# Patient Record
Sex: MALE | Race: OTHER
[De-identification: names, ages, dates, MRNs, and addresses within clinical notes are randomized per-mention and may not be internally consistent; named-entity substitution may affect disease eponyms.]

---

## 2019-05-30 ENCOUNTER — HOSPITAL ENCOUNTER (INPATIENT)
Dept: HOSPITAL 72 - EMR | Age: 69
LOS: 4 days | Discharge: NURSING, REHAB OR LONG-TERM CARE | DRG: 309 | End: 2019-06-03
Payer: MEDICARE

## 2019-05-30 VITALS — DIASTOLIC BLOOD PRESSURE: 66 MMHG | SYSTOLIC BLOOD PRESSURE: 118 MMHG

## 2019-05-30 VITALS — WEIGHT: 161 LBS | HEIGHT: 67 IN | BODY MASS INDEX: 25.27 KG/M2

## 2019-05-30 VITALS — DIASTOLIC BLOOD PRESSURE: 100 MMHG | SYSTOLIC BLOOD PRESSURE: 146 MMHG

## 2019-05-30 DIAGNOSIS — E78.5: ICD-10-CM

## 2019-05-30 DIAGNOSIS — I11.9: ICD-10-CM

## 2019-05-30 DIAGNOSIS — G93.40: ICD-10-CM

## 2019-05-30 DIAGNOSIS — I25.118: ICD-10-CM

## 2019-05-30 DIAGNOSIS — I48.0: Primary | ICD-10-CM

## 2019-05-30 DIAGNOSIS — I69.354: ICD-10-CM

## 2019-05-30 DIAGNOSIS — E11.65: ICD-10-CM

## 2019-05-30 DIAGNOSIS — F03.91: ICD-10-CM

## 2019-05-30 DIAGNOSIS — K21.9: ICD-10-CM

## 2019-05-30 LAB
ADD MANUAL DIFF: NO
ALBUMIN SERPL-MCNC: 3.5 G/DL (ref 3.4–5)
ALBUMIN/GLOB SERPL: 0.8 {RATIO} (ref 1–2.7)
ALP SERPL-CCNC: 87 U/L (ref 46–116)
ALT SERPL-CCNC: 23 U/L (ref 12–78)
ANION GAP SERPL CALC-SCNC: 11 MMOL/L (ref 5–15)
AST SERPL-CCNC: 23 U/L (ref 15–37)
BASOPHILS NFR BLD AUTO: 1.2 % (ref 0–2)
BILIRUB SERPL-MCNC: 0.3 MG/DL (ref 0.2–1)
BUN SERPL-MCNC: 19 MG/DL (ref 7–18)
CALCIUM SERPL-MCNC: 9.2 MG/DL (ref 8.5–10.1)
CHLORIDE SERPL-SCNC: 104 MMOL/L (ref 98–107)
CO2 SERPL-SCNC: 24 MMOL/L (ref 21–32)
CREAT SERPL-MCNC: 1 MG/DL (ref 0.55–1.3)
EOSINOPHIL NFR BLD AUTO: 1.4 % (ref 0–3)
ERYTHROCYTE [DISTWIDTH] IN BLOOD BY AUTOMATED COUNT: 12.9 % (ref 11.6–14.8)
GLOBULIN SER-MCNC: 4.4 G/DL
HCT VFR BLD CALC: 42.9 % (ref 42–52)
HGB BLD-MCNC: 14.5 G/DL (ref 14.2–18)
LYMPHOCYTES NFR BLD AUTO: 25.5 % (ref 20–45)
MCV RBC AUTO: 78 FL (ref 80–99)
MONOCYTES NFR BLD AUTO: 6.3 % (ref 1–10)
NEUTROPHILS NFR BLD AUTO: 65.6 % (ref 45–75)
PLATELET # BLD: 279 K/UL (ref 150–450)
POTASSIUM SERPL-SCNC: 3.8 MMOL/L (ref 3.5–5.1)
RBC # BLD AUTO: 5.52 M/UL (ref 4.7–6.1)
SODIUM SERPL-SCNC: 139 MMOL/L (ref 136–145)
WBC # BLD AUTO: 9.2 K/UL (ref 4.8–10.8)

## 2019-05-30 PROCEDURE — 80329 ANALGESICS NON-OPIOID 1 OR 2: CPT

## 2019-05-30 PROCEDURE — 93005 ELECTROCARDIOGRAM TRACING: CPT

## 2019-05-30 PROCEDURE — 93306 TTE W/DOPPLER COMPLETE: CPT

## 2019-05-30 PROCEDURE — 80307 DRUG TEST PRSMV CHEM ANLYZR: CPT

## 2019-05-30 PROCEDURE — 80053 COMPREHEN METABOLIC PANEL: CPT

## 2019-05-30 PROCEDURE — 80048 BASIC METABOLIC PNL TOTAL CA: CPT

## 2019-05-30 PROCEDURE — 84443 ASSAY THYROID STIM HORMONE: CPT

## 2019-05-30 PROCEDURE — 36415 COLL VENOUS BLD VENIPUNCTURE: CPT

## 2019-05-30 PROCEDURE — 99285 EMERGENCY DEPT VISIT HI MDM: CPT

## 2019-05-30 PROCEDURE — 87081 CULTURE SCREEN ONLY: CPT

## 2019-05-30 PROCEDURE — 83735 ASSAY OF MAGNESIUM: CPT

## 2019-05-30 PROCEDURE — 85025 COMPLETE CBC W/AUTO DIFF WBC: CPT

## 2019-05-30 PROCEDURE — 83880 ASSAY OF NATRIURETIC PEPTIDE: CPT

## 2019-05-30 PROCEDURE — 84484 ASSAY OF TROPONIN QUANT: CPT

## 2019-05-30 PROCEDURE — 96374 THER/PROPH/DIAG INJ IV PUSH: CPT

## 2019-05-30 PROCEDURE — 96375 TX/PRO/DX INJ NEW DRUG ADDON: CPT
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
## 2019-05-31 VITALS — SYSTOLIC BLOOD PRESSURE: 115 MMHG | DIASTOLIC BLOOD PRESSURE: 75 MMHG

## 2019-05-31 VITALS — DIASTOLIC BLOOD PRESSURE: 53 MMHG | SYSTOLIC BLOOD PRESSURE: 104 MMHG

## 2019-05-31 VITALS — SYSTOLIC BLOOD PRESSURE: 106 MMHG | DIASTOLIC BLOOD PRESSURE: 88 MMHG

## 2019-05-31 VITALS — DIASTOLIC BLOOD PRESSURE: 55 MMHG | SYSTOLIC BLOOD PRESSURE: 106 MMHG

## 2019-05-31 VITALS — SYSTOLIC BLOOD PRESSURE: 86 MMHG | DIASTOLIC BLOOD PRESSURE: 60 MMHG

## 2019-05-31 VITALS — DIASTOLIC BLOOD PRESSURE: 64 MMHG | SYSTOLIC BLOOD PRESSURE: 90 MMHG

## 2019-05-31 VITALS — DIASTOLIC BLOOD PRESSURE: 60 MMHG | SYSTOLIC BLOOD PRESSURE: 107 MMHG

## 2019-05-31 LAB
ADD MANUAL DIFF: NO
ANION GAP SERPL CALC-SCNC: 10 MMOL/L (ref 5–15)
BASOPHILS NFR BLD AUTO: 1 % (ref 0–2)
BUN SERPL-MCNC: 14 MG/DL (ref 7–18)
CALCIUM SERPL-MCNC: 8.7 MG/DL (ref 8.5–10.1)
CHLORIDE SERPL-SCNC: 105 MMOL/L (ref 98–107)
CO2 SERPL-SCNC: 23 MMOL/L (ref 21–32)
CREAT SERPL-MCNC: 0.8 MG/DL (ref 0.55–1.3)
EOSINOPHIL NFR BLD AUTO: 0.8 % (ref 0–3)
ERYTHROCYTE [DISTWIDTH] IN BLOOD BY AUTOMATED COUNT: 13.7 % (ref 11.6–14.8)
HCT VFR BLD CALC: 43.9 % (ref 42–52)
HGB BLD-MCNC: 14.5 G/DL (ref 14.2–18)
LYMPHOCYTES NFR BLD AUTO: 34.9 % (ref 20–45)
MCV RBC AUTO: 80 FL (ref 80–99)
MONOCYTES NFR BLD AUTO: 7.3 % (ref 1–10)
NEUTROPHILS NFR BLD AUTO: 56 % (ref 45–75)
PLATELET # BLD: 281 K/UL (ref 150–450)
POTASSIUM SERPL-SCNC: 4.1 MMOL/L (ref 3.5–5.1)
RBC # BLD AUTO: 5.52 M/UL (ref 4.7–6.1)
SODIUM SERPL-SCNC: 138 MMOL/L (ref 136–145)
WBC # BLD AUTO: 8 K/UL (ref 4.8–10.8)

## 2019-05-31 RX ADMIN — HEPARIN SODIUM SCH UNITS: 5000 INJECTION INTRAVENOUS; SUBCUTANEOUS at 09:56

## 2019-05-31 RX ADMIN — METOPROLOL TARTRATE SCH MG: 25 TABLET, FILM COATED ORAL at 09:00

## 2019-05-31 RX ADMIN — HEPARIN SODIUM SCH UNITS: 5000 INJECTION INTRAVENOUS; SUBCUTANEOUS at 21:12

## 2019-05-31 RX ADMIN — DOCUSATE SODIUM SCH MG: 100 CAPSULE, LIQUID FILLED ORAL at 09:51

## 2019-05-31 RX ADMIN — LATANOPROST SCH DROP: 50 SOLUTION OPHTHALMIC at 21:04

## 2019-05-31 RX ADMIN — METOPROLOL TARTRATE SCH MG: 25 TABLET, FILM COATED ORAL at 21:03

## 2019-05-31 NOTE — NUR
NURSE NOTES:

Left a message to  regarding admission orders and new order received. Pt appears 
calm at this time, eating snacks. Afib with HR of 100s noted. Will continue to monitor.

## 2019-05-31 NOTE — NUR
NURSE NOTES:  Received patient from Natasha GRIMES.  Patient in bed, awake, bed in low position, 
locked, bed alarm on, call light within reach.
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
## 2019-05-31 NOTE — NUR
NURSE NOTES:

Noted episodes of Afib, Aflutter with HR of 140s highest, but not sustaining. HR going up 
and down between . Pt sleeping on the bed, calm and comfortable. No acute distress 
noted at this time. will continue to monitor.

## 2019-05-31 NOTE — NUR
CASE MANAGEMENT:REVIEW



68YR OLD MALE BIBA FROM [facility]



CC: BEHAVORIAL COMPLAINT



SI: AFIB W/RVR

98.1   120  18  140/82  98% ON RA

GLUCOSE+159



IS: IV CARDIZEM X2

IV DIGOXIN

**: TO TELEMETRY 



**INTERQUAL CRITERIA MET

## 2019-05-31 NOTE — NUR
NURSE NOTES:

Report received vida Callaway RN Pt arrived the unit via [facility]. Belonging 
checked with the RN. Skin intact. A/O 3-4m, denies any pain at this time. On room air with 
no signs of sob. L side hemiplegia noted. VS /75, P 100, R 20, SaO2 94%. Afib and 
Aflutter noted on the cardiac monitor. IV on L H 20G, asymptomatic. Sitter[place]rington, at the 
bedside. Bed in the lowest position. Side rails up 3. Will continue to monitor.

## 2019-05-31 NOTE — NUR
NURSE NOTES:

Noted HR of 150-160s on cardiac monitor, A-fib. Notified  and new order received 
and carried out. Pt denies any chest pain. No signs of distress noted. Pt appears calm and 
talktive. Will continue to monitor.

## 2019-05-31 NOTE — NUR
HAND-OFF: 

Report given to CORNELIO Kaur. Pt eating breakfast. No acute distress noted at this time.

## 2019-05-31 NOTE — HISTORY AND PHYSICAL REPORT
DATE OF ADMISSION:  05/30/2019

REASON FOR ADMISSION:  Atrial fibrillation, significant agitation,

psychosis.



HISTORY OF PRESENT ILLNESS:  This is a 68-year-old male, who presented to

the emergency room.  The patient was noted to have rapid atrial

fibrillation.  The patient was seen and evaluated by the emergency room

physician and admitted for further care and management.  The patient

initially with significant tachycardia, given intervention, and did

improve.  The patient is significantly agitated and requires psychiatric

evaluation and actually placed on hold.  The findings discussed and

reviewed.  The patient apparently was agitated at the nursing home and was

apparently considered to be unsafe.  Currently, the patient's vital signs

reviewed and blood pressure and heart rate have improved overall.



PAST MEDICAL HISTORY:  Notable for history of CVA, history of psychosis,

history of dementia, history of reflux disease, history of insomnia,

history of paroxysmal atrial fibrillation, and history of glaucoma.



MEDICATIONS:  Reviewed.



ALLERGIES:  Reviewed.



SOCIAL HISTORY:  The patient is a nursing home patient.  He is disabled.



REVIEW OF SYSTEMS:  Difficult to obtain.



PHYSICAL EXAMINATION:

GENERAL:  A well-developed male, chronically ill.

VITAL SIGNS:  Blood pressure 90/64, respirations 18, saturations 96%, pulse

98, and temperature 98.2.

HEENT:  Negative.  Extraocular movements are grossly intact.  Oropharynx is

moist.

NECK:  Supple.  Carotids are 2+.

LUNGS:  Good air entry.  No rhonchi or wheezes.

CARDIAC:  S1, S2.  Irregularly irregular, but rate controlled.

ABDOMEN:  Soft, nontender, nondistended.

EXTREMITIES:  No cyanosis, clubbing, or edema.

NEUROLOGICAL:  The patient with focal weakness.



IMPRESSION:  Atrial fibrillation with rapid ventricular response,

psychosis, agitation, dementia, confusion, and hyperglycemia.



RECOMMENDATIONS:  Supportive care.  Resume medications and monitor.  Diltiazem

given.  We will start low-dose beta-blockers and monitor clinically.

Obtain Cardiology evaluation.  Obtain Psychiatric evaluation and discharge

possibly to locked skilled nursing facility when stable.









  ______________________________________________

  Gabriel Hernandez M.D.





DR:  ANDRES

D:  05/31/2019 08:50

T:  05/31/2019 17:41

JOB#:  [id number]

CC:



VALE

## 2019-06-01 VITALS — DIASTOLIC BLOOD PRESSURE: 60 MMHG | SYSTOLIC BLOOD PRESSURE: 105 MMHG

## 2019-06-01 VITALS — SYSTOLIC BLOOD PRESSURE: 111 MMHG | DIASTOLIC BLOOD PRESSURE: 82 MMHG

## 2019-06-01 VITALS — DIASTOLIC BLOOD PRESSURE: 86 MMHG | SYSTOLIC BLOOD PRESSURE: 109 MMHG

## 2019-06-01 VITALS — DIASTOLIC BLOOD PRESSURE: 56 MMHG | SYSTOLIC BLOOD PRESSURE: 100 MMHG

## 2019-06-01 VITALS — SYSTOLIC BLOOD PRESSURE: 98 MMHG | DIASTOLIC BLOOD PRESSURE: 65 MMHG

## 2019-06-01 VITALS — DIASTOLIC BLOOD PRESSURE: 60 MMHG | SYSTOLIC BLOOD PRESSURE: 107 MMHG

## 2019-06-01 LAB
ADD MANUAL DIFF: NO
ALBUMIN SERPL-MCNC: 3.3 G/DL (ref 3.4–5)
ALBUMIN/GLOB SERPL: 0.8 {RATIO} (ref 1–2.7)
ALP SERPL-CCNC: 83 U/L (ref 46–116)
ALT SERPL-CCNC: 17 U/L (ref 12–78)
ANION GAP SERPL CALC-SCNC: 8 MMOL/L (ref 5–15)
AST SERPL-CCNC: 17 U/L (ref 15–37)
BASOPHILS NFR BLD AUTO: 1.2 % (ref 0–2)
BILIRUB SERPL-MCNC: 0.7 MG/DL (ref 0.2–1)
BUN SERPL-MCNC: 17 MG/DL (ref 7–18)
CALCIUM SERPL-MCNC: 8.7 MG/DL (ref 8.5–10.1)
CHLORIDE SERPL-SCNC: 104 MMOL/L (ref 98–107)
CO2 SERPL-SCNC: 27 MMOL/L (ref 21–32)
CREAT SERPL-MCNC: 1 MG/DL (ref 0.55–1.3)
EOSINOPHIL NFR BLD AUTO: 1.3 % (ref 0–3)
ERYTHROCYTE [DISTWIDTH] IN BLOOD BY AUTOMATED COUNT: 14.1 % (ref 11.6–14.8)
GLOBULIN SER-MCNC: 4.1 G/DL
HCT VFR BLD CALC: 44 % (ref 42–52)
HGB BLD-MCNC: 14.6 G/DL (ref 14.2–18)
LYMPHOCYTES NFR BLD AUTO: 39.1 % (ref 20–45)
MCV RBC AUTO: 80 FL (ref 80–99)
MONOCYTES NFR BLD AUTO: 6.6 % (ref 1–10)
NEUTROPHILS NFR BLD AUTO: 51.9 % (ref 45–75)
PLATELET # BLD: 269 K/UL (ref 150–450)
POTASSIUM SERPL-SCNC: 3.8 MMOL/L (ref 3.5–5.1)
RBC # BLD AUTO: 5.5 M/UL (ref 4.7–6.1)
SODIUM SERPL-SCNC: 138 MMOL/L (ref 136–145)
WBC # BLD AUTO: 7.2 K/UL (ref 4.8–10.8)

## 2019-06-01 RX ADMIN — HEPARIN SODIUM SCH UNITS: 5000 INJECTION INTRAVENOUS; SUBCUTANEOUS at 09:26

## 2019-06-01 RX ADMIN — DOCUSATE SODIUM SCH MG: 100 CAPSULE, LIQUID FILLED ORAL at 09:24

## 2019-06-01 RX ADMIN — METOPROLOL TARTRATE SCH MG: 25 TABLET, FILM COATED ORAL at 21:00

## 2019-06-01 RX ADMIN — LATANOPROST SCH DROP: 50 SOLUTION OPHTHALMIC at 21:52

## 2019-06-01 RX ADMIN — HEPARIN SODIUM SCH UNITS: 5000 INJECTION INTRAVENOUS; SUBCUTANEOUS at 21:54

## 2019-06-01 RX ADMIN — METOPROLOL TARTRATE SCH MG: 25 TABLET, FILM COATED ORAL at 09:15

## 2019-06-01 NOTE — CONSULTATION
DATE OF CONSULTATION:  05/30/2019

CONSULTING PHYSICIAN:  Jason Phillips M.D.



REQUESTING PHYSICIAN:  Gabriel Hernandez M.D.



REASON FOR CONSULTATION:  Rapid atrial fibrillation.



HISTORY OF PRESENT ILLNESS:  This is a 68-year-old male who has a history

of hypertension, atrial fibrillation, and coronary atherosclerosis.  He

presented to the emergency room because of increasing agitation and

combative behavior at the skilled nursing facility where he resides.  He

denied chest pain or shortness of breath.  He does note palpitations.  He

is not a very reliable historian.  Medical records were reviewed for the

remainder of this text.



PAST MEDICAL HISTORY:  Includes hypertension, atrial fibrillation,

congestive heart failure, hyperlipidemia, gastroesophageal reflux disease,

cerebrovascular disease with history of CVA and left-sided paresis, type 2

diabetes mellitus.



ALLERGIES:  None known.



MEDICATIONS:  Prior to admission, reviewed and reconciled.



FAMILY HISTORY:  Not known.



SOCIAL HISTORY:  Negative for current smoking alcohol or substance abuse.



REVIEW OF SYSTEMS:  Limited and pertinent data from review of prior records

is outlined above.



PHYSICAL EXAMINATION:

VITAL SIGNS:  Blood pressure 140/82, pulse 120, respiratory rate 18, and

afebrile.

HEENT:  Normocephalic and atraumatic.  Conjunctivae pink.  Oropharynx

clear.

NECK:  Supple.  Jugular venous pressure normal.

LUNGS:  Clear.

CARDIAC:  Irregularly irregular.  Rapid S1 and S2.  There is no third heart

sound.  No murmur.

ABDOMEN:  Soft and nontender.

EXTREMITIES:  No edema.

NEUROLOGIC:  Left-sided weakness.



LABORATORY DATA:  EKG, atrial fibrillation, nonspecific ST-T wave changes.

White count 9.3 and hemoglobin 14.5.  Potassium 3.8, BUN 19, and

creatinine 1.  Troponin negative.  Albumin normal at 3.5.



Assessment:

1. Acute encephalopathy.

2. Paroxysmal atrial fibrillation, now with rapid ventricular

response.

3. Hypertensive heart disease with high normal blood pressure range.

4. Diastolic dysfunction with no clinical signs of acute congestive

heart failure.

5. Hyperglycemia.



PLAN:  Add beta-blocker.  Additional diltiazem for rate control.  The

patient was given digoxin earlier in the emergency room.  Neuroleptic

therapy for behavior issue.  No plans for anticoagulation due to bleeding

risk in this patient.  This will be readdressed, however, once additional

historical data can be obtained.  Cardiac monitoring and follow up

laboratory studies.









  ______________________________________________

  Jason Phillips M.D.





DR:  BRITTANY

D:  06/01/2019 01:00

T:  06/01/2019 01:30

JOB#:  [id number]

CC:



VALE
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
## 2019-06-01 NOTE — PROGRESS NOTE
DATE:  05/31/2019

CARDIOLOGY PROGRESS NOTE



SUBJECTIVE:  The patient has no complaints.  He has been calm and

cooperative with staff.



The patient received IV digoxin and IV diltiazem dose last night and

early this morning for rapid atrial fibrillation.  The rest of the day, he

had atrial fibrillation that was rate controlled, but had several episodes

of pauses up to 4 seconds.  All are asymptomatic.



OBJECTIVE:

VITAL SIGNS:  Blood pressure 90/64 to 106/88, heart rate 74 to 116,

respiratory rate 18, and afebrile.

LUNGS:  Clear.

CARDIAC:  Irregularly irregular.  Normal S1 and S2 with 1/6 systolic murmur

at the apex.

ABDOMEN:  Soft.

EXTREMITIES:  No edema.  Left-sided weakness noted.



IMPRESSION:

1. Conduction system disease.

2. Paroxysmal atrial fibrillation.

3. Cerebrovascular disease with dementia, agitation, and left

hemiparesis.

4. Hypertensive heart disease with low range blood pressure at times.



PLAN:

1. Titrate beta-blocker dosing.

2. Continue cardiac monitoring.

3. May ultimately need pacemaker.

4. Neuroleptic therapy per primary care physician.

5. Volume resuscitation for _____ episodes of low blood pressure.

6. Followup lab studies.









  ______________________________________________

  Jason Phillips M.D.





DR:  ELIS

D:  06/01/2019 01:10

T:  06/01/2019 01:49

JOB#:  [id number]

CC:

## 2019-06-01 NOTE — NUR
NURSE NOTES:

Received report from CORNELIO Ponce. Patient is sitting in bed, eating his breakfast. Patient has 
no signs of acute distress at this moment. Respiration even and non labored on room air. No 
SOB noted. Bed in lowest position with two side rails up, wheels locked and alarm on. Bed 
side table, urinal and Call light within reach. Will continue plan of care

## 2019-06-01 NOTE — GENERAL PROGRESS NOTE
Assessment/Plan


Assessment/Plan:





IMPRESSION:  Atrial fibrillation with rapid ventricular response,


psychosis, agitation, dementia, confusion, and hyperglycemia.





PLAN


continue same


dc to snf


monitor rhythm


psych and cards noted


labs reviewed


impression, plan, and exam edited and reviewed in detail


care discussed with RN





Subjective


ROS Limited/Unobtainable:  Yes


Allergies:  


Coded Allergies:  


     No Known Allergies (Unverified , 5/30/19)


Subjective


confused


psych noted


sitter discontinued





Objective





Last 24 Hour Vital Signs








  Date Time  Temp Pulse Resp B/P (MAP) Pulse Ox O2 Delivery O2 Flow Rate FiO2


 


6/1/19 09:15  68  96/59    


 


6/1/19 08:00 98.1 70 18 98/65 (76) 96   


 


6/1/19 04:00 98.9 65 17 109/86 (94) 97   


 


6/1/19 03:51  66      


 


6/1/19 00:00 96.9 72 18 111/82 (92) 96   


 


5/31/19 23:41  72      


 


5/31/19 21:03  69  107/60    


 


5/31/19 21:00      Room Air  


 


5/31/19 20:29  85      


 


5/31/19 20:00 97.3 69 18 107/60 (76) 98   


 


5/31/19 16:00  95      


 


5/31/19 16:00 96.8 59 18 86/60 (69) 96   


 


5/31/19 12:00  74      


 


5/31/19 11:59 97.3 76 18 106/88 (94) 96   

















Intake and Output  


 


 5/31/19 6/1/19





 19:00 07:00


 


  


 


# Voids  2








Laboratory Tests


6/1/19 07:55: 


White Blood Count 7.2, Red Blood Count 5.50, Hemoglobin 14.6, Hematocrit 44.0, 

Mean Corpuscular Volume 80, Mean Corpuscular Hemoglobin 26.5L, Mean Corpuscular 

Hemoglobin Concent 33.1, Red Cell Distribution Width 14.1, Platelet Count 269, 

Mean Platelet Volume 5.7L, Neutrophils (%) (Auto) 51.9, Lymphocytes (%) (Auto) 

39.1, Monocytes (%) (Auto) 6.6, Eosinophils (%) (Auto) 1.3, Basophils (%) (Auto

) 1.2, Sodium Level 138, Potassium Level 3.8, Chloride Level 104, Carbon 

Dioxide Level 27, Anion Gap 8, Blood Urea Nitrogen 17, Creatinine 1.0, Estimat 

Glomerular Filtration Rate > 60, Glucose Level 98, Calcium Level 8.7, Magnesium 

Level 2.3, Total Bilirubin 0.7, Aspartate Amino Transf (AST/SGOT) 17, Alanine 

Aminotransferase (ALT/SGPT) 17, Alkaline Phosphatase 83, Pro-B-Type Natriuretic 

Peptide 293H, Total Protein 7.4, Albumin 3.3L, Globulin 4.1, Albumin/Globulin 

Ratio 0.8L, Thyroid Stimulating Hormone (TSH) 0.684


Height (Feet):  5


Height (Inches):  7.00


Weight (Pounds):  161


Objective


GENERAL:  A well-developed male, chronically ill.


HEENT:  Negative.  Extraocular movements are grossly intact.  Oropharynx is


moist.


NECK:  Supple.  Carotids are 2+.


LUNGS:  Good air entry.  No rhonchi or wheezes.


CARDIAC:  S1, S2.  RRR without MRG


ABDOMEN:  Soft, nontender, nondistended.


EXTREMITIES:  No cyanosis, clubbing, or edema.


NEUROLOGICAL:  The patient with focal weakness.











Gabriel Hernandez MD Jun 1, 2019 10:19

## 2019-06-01 NOTE — NUR
NURSE NOTES:



Received report from MCKENNA Fuentes MD. Patient is asleep in bed, A/O x3, arousable to name and 
shaking. Sinus rhythm on cardiac monitor. No s/s of acute distress noted at this time. 
Saturating well on room air. Left hand 20g IV saline lock, intact and patent. Bed locked in 
lowest position with side rails up x2. Call light left within reach. Will continue to 
monitor.

## 2019-06-02 VITALS — SYSTOLIC BLOOD PRESSURE: 133 MMHG | DIASTOLIC BLOOD PRESSURE: 58 MMHG

## 2019-06-02 VITALS — DIASTOLIC BLOOD PRESSURE: 63 MMHG | SYSTOLIC BLOOD PRESSURE: 113 MMHG

## 2019-06-02 VITALS — DIASTOLIC BLOOD PRESSURE: 72 MMHG | SYSTOLIC BLOOD PRESSURE: 121 MMHG

## 2019-06-02 VITALS — SYSTOLIC BLOOD PRESSURE: 99 MMHG | DIASTOLIC BLOOD PRESSURE: 66 MMHG

## 2019-06-02 VITALS — DIASTOLIC BLOOD PRESSURE: 65 MMHG | SYSTOLIC BLOOD PRESSURE: 115 MMHG

## 2019-06-02 VITALS — SYSTOLIC BLOOD PRESSURE: 102 MMHG | DIASTOLIC BLOOD PRESSURE: 58 MMHG

## 2019-06-02 RX ADMIN — HEPARIN SODIUM SCH UNITS: 5000 INJECTION INTRAVENOUS; SUBCUTANEOUS at 20:50

## 2019-06-02 RX ADMIN — HEPARIN SODIUM SCH UNITS: 5000 INJECTION INTRAVENOUS; SUBCUTANEOUS at 09:03

## 2019-06-02 RX ADMIN — METOPROLOL TARTRATE SCH MG: 25 TABLET, FILM COATED ORAL at 09:00

## 2019-06-02 RX ADMIN — DOCUSATE SODIUM SCH MG: 100 CAPSULE, LIQUID FILLED ORAL at 09:00

## 2019-06-02 RX ADMIN — LATANOPROST SCH DROP: 50 SOLUTION OPHTHALMIC at 21:00

## 2019-06-02 NOTE — GENERAL PROGRESS NOTE
Assessment/Plan


Assessment/Plan:





IMPRESSION:  Atrial fibrillation with rapid ventricular response,


psychosis, agitation, dementia, confusion, and hyperglycemia.





PLAN


continue same


dc to snf in am


monitor rhythm


psych and cards noted


labs reviewed


no distress


impression, plan, and exam edited and reviewed in detail


care discussed with RN





Subjective


Allergies:  


Coded Allergies:  


     No Known Allergies (Unverified , 5/30/19)


Subjective


confused


psych noted


sitter discontinued


in NSR





Objective





Last 24 Hour Vital Signs








  Date Time  Temp Pulse Resp B/P (MAP) Pulse Ox O2 Delivery O2 Flow Rate FiO2


 


6/2/19 09:00  57  115/65    


 


6/2/19 04:00  57      


 


6/2/19 04:00 98.1 65 18 115/65 (82) 98   


 


6/2/19 00:00 97.6 70 20 113/63 (80) 100   


 


6/1/19 23:46  71      


 


6/1/19 21:00      Room Air  


 


6/1/19 21:00  71  100/56    


 


6/1/19 20:00 98.1 71 18 100/56 (71) 96   


 


6/1/19 19:25  63      


 


6/1/19 16:00  67      


 


6/1/19 16:00 98.0 63 18 105/60 (75) 96   


 


6/1/19 12:00  60      


 


6/1/19 12:00 98.1 60 20 107/60 (76) 97   


 


6/1/19 09:15  68  96/59    

















Intake and Output  


 


 6/1/19 6/2/19





 18:59 06:59


 


Intake Total 360 ml 


 


Output Total 550 ml 


 


Balance -190 ml 


 


  


 


Intake Oral 360 ml 


 


Output Urine Total 550 ml 


 


# Voids  1








Height (Feet):  5


Height (Inches):  7.00


Weight (Pounds):  161


Objective


GENERAL:  A well-developed male, chronically ill.


HEENT:  Negative.  Extraocular movements are grossly intact.  Oropharynx is


moist.


NECK:  Supple.  Carotids are 2+.


LUNGS:  Good air entry.  No rhonchi or wheezes.


CARDIAC:  S1, S2.  RRR without MRG


ABDOMEN:  Soft, nontender, nondistended.


EXTREMITIES:  No cyanosis, clubbing, or edema.


NEUROLOGICAL:  The patient with focal weakness.











Gabriel Hernandez MD Jun 2, 2019 09:08

## 2019-06-02 NOTE — PROGRESS NOTE
DATE:  06/01/2019

CARDIOLOGY PROGRESS NOTE



SUBJECTIVE:  The patient has not had any more pauses.  Cardiac rhythm

remains stable.  Episodes of atrial fibrillation with controlled rate

now.



OBJECTIVE:

VITAL SIGNS:  Blood pressure 96/59 to 111/82, heart rate 65 to 72,

respiratory rate 18, and afebrile.

LUNGS:  Clear.

CARDIAC:  Irregularly irregular.  Normal S1 and S2.  No new

murmurs.

ABDOMEN:  Soft and nontender.

EXTREMITIES:  There is no edema.



LABORATORY DATA:  Natriuretic peptide is 293.  BUN 17 and creatinine 1.



IMPRESSION:

1. Paroxysmal atrial fibrillation.

2. Cerebrovascular disease with left hemiparesis.

3. Hypertensive heart disease, still with low range blood pressure.

4. Conduction system disease of the heart, asymptomatic.



PLAN:

1. Continue low-dose beta-blocker.

2. No plans for anticoagulation due to bleeding risk and compliance

issues ____.









  ______________________________________________

  Jason Phillips M.D.





DR:  BRITTANY

D:  06/01/2019 23:42

T:  06/01/2019 23:56

JOB#:  [id number]

CC:

## 2019-06-02 NOTE — PROGRESS NOTE
DATE:  06/02/2019

CARDIOLOGY PROGRESS NOTE



SUBJECTIVE:  The patient remains in sinus rhythm, less confused.  He is

cooperative with staff.



OBJECTIVE:

VITAL SIGNS:  Blood pressure 100/56 to 115/65, heart rate 57 to 71,

respiratory rate 18, and afebrile.

NECK:  Supple.

LUNGS:  Clear.

CARDIAC:  Regular.  Normal S1 and S2.

ABDOMEN:  Soft.

EXTREMITIES:  No edema.



IMPRESSION:

1. Paroxysmal atrial fibrillation.

2. Cerebrovascular disease with dementia.

3. Ischemic heart disease with chronic stable angina.



PLAN:

1. No anticoagulation planned in view of increased risk to benefit

ratio.

2. Continue current cardiovascular regimen without change, which

includes low-dose beta-blocker that will be changed to once a day dosing

long-acting form.









  ______________________________________________

  Jason Phillips M.D.





DR:  Krishna

D:  06/02/2019 17:56

T:  06/02/2019 18:05

JOB#:  [id number]

CC:

## 2019-06-02 NOTE — CARDIOLOGY REPORT
--------------- APPROVED REPORT --------------





EXAM: Two-dimensional and M-mode echocardiogram with Doppler and color 

Doppler.



INDICATION

A-FIB



M-Mode DIMENSIONS 

IVSd1.4 (0.7-1.1cm)Left Atrium (MM)3.4 (1.6-4.0cm)

LVDd4.1 (3.5-5.6cm)Aortic Root2.7 (2.0-3.7cm)

PWd1.2 (0.7-1.1cm)Aortic Cusp Exc.1.6 (1.5-2.0cm)

IVSs1.5 cm

LVDs2.8 (2.5-4.0cm)

PWs1.5 cm





Technically difficult study due to poor acoustical windows.

Normal left ventricular chamber size, systolic function and wall motion to extent 

visualized.

Left ventricular ejection fraction estimated to be 50- 55%.

No evidence of  left ventricular hypertrophy .

 Anterior Echo-free space, may be due to pericardial fat or effusion.

All other cardiac chamber sizes are within normal limits. 

Aortic valve calcification with normal cusp excursion .

Mildly thickened mitral valve leaflets with normal excursion.

Mild mitral annulus and aortic root calcification.

Pulmonic valve not well visualized.

IVC at normal size with  physiologic collapse.



A  color flow and spectral Doppler study was performed and revealed:

No aortic insufficiency .

Left ventricular diastolic function is not diagnostic due to atrial fibrillation .

Trace mitral regurgitation.

Trace  tricuspid regurgitation.

Tricuspid  systolic velocities suggests peak right ventricular systolic pressure of  

23mmHg.

## 2019-06-03 VITALS — DIASTOLIC BLOOD PRESSURE: 64 MMHG | SYSTOLIC BLOOD PRESSURE: 113 MMHG

## 2019-06-03 VITALS — SYSTOLIC BLOOD PRESSURE: 125 MMHG | DIASTOLIC BLOOD PRESSURE: 68 MMHG

## 2019-06-03 VITALS — SYSTOLIC BLOOD PRESSURE: 119 MMHG | DIASTOLIC BLOOD PRESSURE: 67 MMHG

## 2019-06-03 VITALS — SYSTOLIC BLOOD PRESSURE: 111 MMHG | DIASTOLIC BLOOD PRESSURE: 62 MMHG

## 2019-06-03 RX ADMIN — DOCUSATE SODIUM SCH MG: 100 CAPSULE, LIQUID FILLED ORAL at 08:31

## 2019-06-03 RX ADMIN — HEPARIN SODIUM SCH UNITS: 5000 INJECTION INTRAVENOUS; SUBCUTANEOUS at 08:35

## 2019-06-03 NOTE — NUR
Report given to Dariel GRIMES. Patient is awake and alertx4 with forgetfulness, vital signs 
stable. No s/s acute distress. No acute needs. Endorsed to AM shift.

## 2019-06-03 NOTE — NUR
NURSE NOTES:

pt awake alert, no distress. no sob. no c/o pain.  call light within reach. bed in lowest 
position, locked.

## 2019-06-03 NOTE — NUR
NURSE NOTES:

called and spoke leana Miller from [facility] [address] is pt bed awaiting ambulance

## 2019-06-03 NOTE — GENERAL PROGRESS NOTE
Assessment/Plan


Assessment/Plan:





IMPRESSION:  Atrial fibrillation with rapid ventricular response,


psychosis, agitation, dementia, confusion, and hyperglycemia.





PLAN


continue same


dc to snf in am


monitor rhythm


psych and cards noted


labs reviewed


no distress; maintain same


impression, plan, and exam edited and reviewed in detail


care discussed with RN





Subjective


ROS Limited/Unobtainable:  Yes


Allergies:  


Coded Allergies:  


     No Known Allergies (Unverified , 5/30/19)


Subjective


confused


at baseline


in NSR





Objective





Last 24 Hour Vital Signs








  Date Time  Temp Pulse Resp B/P (MAP) Pulse Ox O2 Delivery O2 Flow Rate FiO2


 


6/3/19 04:26 98.3 66 18 125/68 (87) 96   


 


6/3/19 04:26  58      


 


6/3/19 00:00 97.0 59 18 119/67 (84) 96   


 


6/2/19 21:00      Room Air  


 


6/2/19 20:44  62  133/58    


 


6/2/19 20:24 98.0 62 18 133/58 (83) 96   


 


6/2/19 20:00  64      


 


6/2/19 16:00  64      


 


6/2/19 16:00 98.1 62 16 102/58 (73) 96   


 


6/2/19 12:00 97.6 67 18 99/66 (77) 99   


 


6/2/19 12:00  62      


 


6/2/19 09:00      Room Air  


 


6/2/19 09:00  57  115/65    


 


6/2/19 08:00 97.7 72 18 121/72 (88) 100   


 


6/2/19 08:00  65      

















Intake and Output  


 


 6/2/19 6/3/19





 19:00 07:00


 


Intake Total 360 ml 


 


Output Total 600 ml 1000 ml


 


Balance -240 ml -1000 ml


 


  


 


Intake Oral 360 ml 


 


Output Urine Total 600 ml 1000 ml


 


# Voids  5








Height (Feet):  5


Height (Inches):  7.00


Weight (Pounds):  161


Objective


GENERAL:  A well-developed male, chronically ill.


HEENT:  Negative.  Extraocular movements are grossly intact.  Oropharynx is


moist.


NECK:  Supple.  Carotids are 2+.


LUNGS:  Good air entry.  No rhonchi or wheezes.


CARDIAC:  S1, S2.  RRR without MRG


ABDOMEN:  Soft, nontender, nondistended.


EXTREMITIES:  No cyanosis, clubbing, or edema.


NEUROLOGICAL:  The patient with focal weakness.











Gabriel Hernandez MD Eliceo 3, 2019 08:01

## 2019-06-04 NOTE — PROGRESS NOTE
DATE:  06/03/2019



SUBJECTIVE:  The patient is more alert and able to answer questions.  The

patient has poor insight and judgment into his mental condition.



MENTAL STATUS EXAMINATION:  The patient is alert and oriented to time,

self, and place.  Mood is neutral.  Affect is constricted.  Congruent with

mood.  Thought process is concrete.  Thought content, no suicidal or

homicidal ideation.



ASSESSMENT:  Dementia with behavioral disturbance.



PLAN:

1. We will continue the Seroquel, continue Remeron.

2. Provide the patient with reality orientation and supportive

therapy.









  ______________________________________________

  Shay Smith M.D.





DR:  JERAMY

D:  06/04/2019 00:03

T:  06/04/2019 00:39

JOB#:  [id number]

CC:

## 2019-06-04 NOTE — DISCHARGE SUMMARY
Discharge Summary


Discharge Summary


_


DATE OF ADMISSION: 5/30/2019





DATE OF DISCHARGE: 6/03/2019





DISCHARGED BY: Dr. Hernandez





REASON FOR ADMISSION: 


[] 68 years old male with past medical history of hyperlipidemia persistent 

atrial fibrillation GERD hypOtension, history of CVA with left-sided paralysis, 

resident of skilled nursing facility, was sent to emergency room for evaluation 

due to agitation.  Patient apparently keep the nurse at the facility patient 

was brought on 5150 hold by [employer].  Upon evaluation patient was tachycardic with 

heart rate of 120 other vital signs stable.  Laboratory work-up revealed no 

leukocytosis stable hemoglobin hematocrit.  Stable electrolytes.  BUN 19, 

creatinine 1.0.  Glucose 159.  Stable LFT.  Troponin negative.  Albumin 3.5.  

TSH within normal limits.  Urine toxicology screen was negative.  Serum Tylenol 

and salicylate are negative.  Serum alcohol level was negative.  EKG revealed 

atrial fibrillation with rapid ventricular response.  In emergency department 

patient received IV Cardizem with initial improvement in his heart rate.  

However later developed recurrence of tachycardia and was loaded with IV 

digoxin.  Patient subsequently was transferred to telemetry floor for further 

management.  








CONSULTANTS:


cardiologist Dr. Phillips


psychiatrist 


 


[facility] COURSE: 


Patient admitted to telemetry floor.   


Initially sitter was provided for safety. 


Cardiologist and psychiatrist followed. 


Echocardiogram revealed ejection fraction 50 to 50% with no evidence of wall 

motion abnormality to the extent visualized.  


No evidence of left ventricular hypertrophy.  


Right ventricular systolic pressure of 23.  


Patient started on low dose of  beta-blocker and Cardizem for rate control.  


Patient also received Digoxin for rate control. 


Patient was observed on telemetry. 


Patient   noted to have   several episodes of pauses up to 4-second,  which 

were asymptomatic.  


Patient likely had conduction system disease and  may ultimately need pacemaker 

at some time,  as per cardiologist.  


Cardiac monitoring was continued.  


Beta-blocker was dose was optimized.  


Blood pressure was closely monitored.


Volume resuscitation provided for episodes of low blood pressure. 


No plans for anticoagulation given bleeding risk and compliance issues.  


Patient eventually converted to sinus rhythm .


Current cardiovascular regimen was continued , including low-dose beta-blocker .





Psychiatrist followed .  


Psychiatrist diagnosed patient with dementia with behavioral disturbances.  


Patient started on Seroquel and Remeron.


Patient provided with reality orientation  and supportive therapy.    


Per psychiatrist,  patient was not an imminent danger to self or others.  


Initial sitter provided  for safety, was discontinued.





Patient's behavior stabilized. Srable hemodynamic status. 


Patient was ready for discharge to skilled nursing facility for continuation of 

care. 





FINAL DIAGNOSES: 


Acute encephalopathy


Paroxysmal atrial fibrillation with rapid ventricular response


Hypertensive heart disease


Diastolic dysfunction


Conduction system disease


Hyperglycemia


History of CVA


Cerebrovascular disease with left hemiparesis


Dementia with behavioral disturbances


 





DISCHARGE MEDICATIONS:


See Medication Reconciliation list.





DISCHARGE INSTRUCTIONS:


Patient was discharged to the skilled nursing facility. 


Follow up with medical doctor at the facility.














I have been assigned to dictate discharge summary for this account. 


I was not involved in the patient's management.











Roya Henderson NP Jun 4, 2019 09:31

## 2019-06-05 NOTE — PROGRESS NOTE
DATE:  06/03/2019

CARDIOLOGY PROGRESS NOTE



Late entry for 06/03/2019



SUBJECTIVE:  The patient seen and evaluated.  Case was discussed with Dr. Hernandez.  The patient is taking oral medications.  He has no chest pain,

no shortness of breath.  Monitored rhythm sinus and sinus bradycardia.



OBJECTIVE:

VITAL SIGNS:  Blood pressure 125/68, pulse 66, respirations 18.

LUNGS:  Clear.

CARDIAC:  Regular rhythm .  Normal S1, S2.

ABDOMEN:  Soft.

EXTREMITIES:  No edema.



IMPRESSION:

1. Ischemic heart disease.

2. Paroxysmal atrial fibrillation.

3. Cerebrovascular disease.

4. Dementia with psychosis.



PLAN:

1. Continue current cardiovascular regimen.

2. No plan for anticoagulation due to increased bleeding risks.

3. Psych therapy is in place.

4. Discharge medication regimen reviewed and reconciled .

5. Long-term medical therapy planned.









  ______________________________________________

  Jason Phillips M.D.





DR:  Morena

D:  06/04/2019 23:27

T:  06/04/2019 23:44

JOB#:  [id number]

CC:



VALE

## 2019-06-05 NOTE — CARDIOLOGY REPORT
--------------- APPROVED REPORT --------------





EKG Measurement

Heart 

[id number]

[id number]

[id number]

[id number]





Atrial fibrillation

Nonspecific ST and T wave abnormality

Abnormal ECG

## 2020-07-12 ENCOUNTER — HOSPITAL ENCOUNTER (EMERGENCY)
Dept: HOSPITAL 72 - EMR | Age: 70
LOS: 1 days | Discharge: ANOTHER HOSPITAL | End: 2020-07-13
Payer: MEDICARE

## 2020-07-12 VITALS — SYSTOLIC BLOOD PRESSURE: 124 MMHG | DIASTOLIC BLOOD PRESSURE: 70 MMHG

## 2020-07-12 VITALS — BODY MASS INDEX: 26.48 KG/M2 | HEIGHT: 70 IN | WEIGHT: 185 LBS

## 2020-07-12 DIAGNOSIS — G81.94: ICD-10-CM

## 2020-07-12 DIAGNOSIS — I48.91: ICD-10-CM

## 2020-07-12 DIAGNOSIS — Z79.01: ICD-10-CM

## 2020-07-12 DIAGNOSIS — R00.0: ICD-10-CM

## 2020-07-12 DIAGNOSIS — I10: ICD-10-CM

## 2020-07-12 DIAGNOSIS — S31.31XA: Primary | ICD-10-CM

## 2020-07-12 DIAGNOSIS — E78.5: ICD-10-CM
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
## 2020-07-12 DIAGNOSIS — F03.90: ICD-10-CM

## 2020-07-12 DIAGNOSIS — K21.9: ICD-10-CM
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
## 2020-07-12 DIAGNOSIS — G83.9: ICD-10-CM

## 2020-07-12 PROCEDURE — 85610 PROTHROMBIN TIME: CPT

## 2020-07-12 PROCEDURE — 87086 URINE CULTURE/COLONY COUNT: CPT

## 2020-07-12 PROCEDURE — 80053 COMPREHEN METABOLIC PANEL: CPT

## 2020-07-12 PROCEDURE — 93005 ELECTROCARDIOGRAM TRACING: CPT

## 2020-07-12 PROCEDURE — 99284 EMERGENCY DEPT VISIT MOD MDM: CPT

## 2020-07-12 PROCEDURE — 81003 URINALYSIS AUTO W/O SCOPE: CPT

## 2020-07-12 PROCEDURE — 96365 THER/PROPH/DIAG IV INF INIT: CPT

## 2020-07-12 PROCEDURE — 36415 COLL VENOUS BLD VENIPUNCTURE: CPT

## 2020-07-12 PROCEDURE — 85730 THROMBOPLASTIN TIME PARTIAL: CPT

## 2020-07-12 PROCEDURE — 85025 COMPLETE CBC W/AUTO DIFF WBC: CPT

## 2020-07-13 VITALS — SYSTOLIC BLOOD PRESSURE: 120 MMHG | DIASTOLIC BLOOD PRESSURE: 76 MMHG

## 2020-07-13 LAB
ADD MANUAL DIFF: NO
ALBUMIN SERPL-MCNC: 3.6 G/DL (ref 3.4–5)
ALBUMIN/GLOB SERPL: 0.9 {RATIO} (ref 1–2.7)
ALP SERPL-CCNC: 79 U/L (ref 46–116)
ALT SERPL-CCNC: 20 U/L (ref 12–78)
ANION GAP SERPL CALC-SCNC: 11 MMOL/L (ref 5–15)
APPEARANCE UR: (no result)
APTT BLD: 49 SEC (ref 23–33)
APTT PPP: (no result) S
AST SERPL-CCNC: 16 U/L (ref 15–37)
BASOPHILS NFR BLD AUTO: 0.9 % (ref 0–2)
BILIRUB SERPL-MCNC: 0.3 MG/DL (ref 0.2–1)
BUN SERPL-MCNC: 20 MG/DL (ref 7–18)
CALCIUM SERPL-MCNC: 8.3 MG/DL (ref 8.5–10.1)
CHLORIDE SERPL-SCNC: 107 MMOL/L (ref 98–107)
CO2 SERPL-SCNC: 24 MMOL/L (ref 21–32)
CREAT SERPL-MCNC: 1.2 MG/DL (ref 0.55–1.3)
EOSINOPHIL NFR BLD AUTO: 1 % (ref 0–3)
ERYTHROCYTE [DISTWIDTH] IN BLOOD BY AUTOMATED COUNT: 13.4 % (ref 11.6–14.8)
GLOBULIN SER-MCNC: 3.9 G/DL
GLUCOSE UR STRIP-MCNC: NEGATIVE MG/DL
HCT VFR BLD CALC: 49.6 % (ref 42–52)
HGB BLD-MCNC: 16.1 G/DL (ref 14.2–18)
INR PPP: 1.6 (ref 0.9–1.1)
KETONES UR QL STRIP: (no result)
LEUKOCYTE ESTERASE UR QL STRIP: (no result)
LYMPHOCYTES NFR BLD AUTO: 23.4 % (ref 20–45)
MCV RBC AUTO: 84 FL (ref 80–99)
MONOCYTES NFR BLD AUTO: 6.9 % (ref 1–10)
NEUTROPHILS NFR BLD AUTO: 67.7 % (ref 45–75)
NITRITE UR QL STRIP: NEGATIVE
PH UR STRIP: 5 [PH] (ref 4.5–8)
PLATELET # BLD: 232 K/UL (ref 150–450)
POTASSIUM SERPL-SCNC: 3.9 MMOL/L (ref 3.5–5.1)
PROT UR QL STRIP: (no result)
RBC # BLD AUTO: 5.9 M/UL (ref 4.7–6.1)
SODIUM SERPL-SCNC: 142 MMOL/L (ref 136–145)
SP GR UR STRIP: 1.02 (ref 1–1.03)
UROBILINOGEN UR-MCNC: NORMAL MG/DL (ref 0–1)
WBC # BLD AUTO: 8.6 K/UL (ref 4.8–10.8)

## 2020-07-13 NOTE — EMERGENCY ROOM REPORT
History of Present Illness


General


Chief Complaint:  General Complaint


Source:  Patient, Medical Record, EMS





Present Illness


HPI


Disclaimer: Please note that this report is being documented using DRAGON 

technology. This can lead to erroneous entry secondary to incorrect 

interpretation by the dictating instrument.





HPI: 69-year-old male history of dementia, atrial fibrillation on Xarelto, 

prior stroke with residual left-sided weakness presents for evaluation of 

scrotal injury.  The patient was having his diaper change in a skilled nursing 

facility when he states he accidentally scratched it and opened up a chronic 

wound.  Bleeding was controlled with pressure bandage prior to arrival.  Did 

not provide estimated blood loss.  He states this is a chronic problem happened 

in the past.  He seems to be somewhat confused about the timeline and of his 

injury. EMS states he was very combative with the staff and accidentally 

lacerated the scrotum using his fingernail while having his diaper changed.  He 

denies any complaints of pain or discomfort at this time.  Denies injury to the 

glans or penis


 


PMH: Dementia, atrial fibrillation, CVA, hypertension, hyperlipidemia


 


PSH: Reviewed


 


Allergies: Denied


 


Social Hx: Reviewed


Allergies:  


Coded Allergies:  


     No Known Allergies (Unverified , 5/30/19)





COVID-19 Screening


Contact w/high risk pt:  No


Experienced COVID-19 symptoms?:  No


COVID-19 Testing performed PTA:  No





Nursing Documentation-PMH


Past Medical History:  No History, Except For


Hx Cardiac Problems:  Yes - hypotension, hyperlipidemia, persistent a-fib


Hx Cancer:  No


Hx Gastrointestinal Problems:  Yes - GERD


Hx Neurological Problems:  Yes


Hx Paralysis:  Yes - left side





Review of Systems


All Other Systems:  negative except mentioned in HPI





Physical Exam





Vital Signs








  Date Time  Temp Pulse Resp B/P (MAP) Pulse Ox O2 Delivery O2 Flow Rate FiO2


 


7/12/20 23:40 98.2 86 18 124/70 (88) 98 Room Air  





 





General: Awake and alert, no acute distress


HEENT: NC/AT. EOMI. 


Cardiovascular: Irregularly irregular rhythm regular rate.  


Resp: Normal work of breathing. No cough, wheezing or crackles appreciated


Abdomen: Abdomen is soft, nondistended.  Nontender


: Scrotal skin over the left testicle is avulsed with an exposed left 

testicle.  No active bleeding.  Testes in anatomic position.  No injury to the 

glans or shaft of the penis.


Skin: Intact.  No abrasions, laceration or rash over the exposed skin


MSK: Normal tone and bulk. Moving all extremities.  No obvious deformity.  Left 

hand and wrist in flexion.


Neuro: Awake and alert to self.  Appears to be mentating appropriately at times 

however other times can be tangential and expressed some paranoid thoughts.  

Poor insight into his current injury.





Medical Decision Making


Diagnostic Impression:  


 Primary Impression:  


 Laceration of scrotum


 Additional Impression:  


 Atrial fibrillation


ER Course


This is a 69-year-old male on Xarelto for atrial fibrillation presenting for 

laceration avulsion of the scrotal skin over the left testicle.  This is a 

complex injury and discussed with urology who states he may require staged 

reconstruction and possible thigh flap which cannot be performed at our 

facility.  He will require transfer higher level of care.  Preop labs ordered.  

Gauze bandage soaked in warm saline applied to wound per urology 

recommendations.  With will contact nearby hospitals.





0140:


Patient in atrial fibrillation with variable rate between 90 and 130 bpm.  

Currently controlled and he is in no distress.  Labs are returned within normal 

limits. INR slightly elevated 1.6.  Urinalysis shows evidence of urinary tract 

infection with 2+ leukocyte esterase, numerous red cells and 10-15 WBCs.  

Ceftriaxone given.  Patient has been discussed with urology department at [facility],  Dr. Levin. Will transfer to [facility] ED for urology 

evaluation.  Patient stable condition and appropriate for transfer.





Laboratory Tests








Test


  7/13/20


00:26 7/13/20


00:37


 


White Blood Count


  8.6 K/UL


(4.8-10.8) 


 


 


Red Blood Count


  5.90 M/UL


(4.70-6.10) 


 


 


Hemoglobin


  16.1 G/DL


(14.2-18.0) 


 


 


Hematocrit


  49.6 %


(42.0-52.0) 


 


 


Mean Corpuscular Volume 84 FL (80-99)   


 


Mean Corpuscular Hemoglobin


  27.3 PG


(27.0-31.0) 


 


 


Mean Corpuscular Hemoglobin


Concent 32.5 G/DL


(32.0-36.0) 


 


 


Red Cell Distribution Width


  13.4 %


(11.6-14.8) 


 


 


Platelet Count


  232 K/UL


(150-450) 


 


 


Mean Platelet Volume


  6.4 FL


(6.5-10.1)  L 


 


 


Neutrophils (%) (Auto)


  67.7 %


(45.0-75.0) 


 


 


Lymphocytes (%) (Auto)


  23.4 %


(20.0-45.0) 


 


 


Monocytes (%) (Auto)


  6.9 %


(1.0-10.0) 


 


 


Eosinophils (%) (Auto)


  1.0 %


(0.0-3.0) 


 


 


Basophils (%) (Auto)


  0.9 %


(0.0-2.0) 


 


 


Prothrombin Time


  17.0 SEC


(9.30-11.50)  H 


 


 


Prothrombin Time INR


  1.6 (0.9-1.1)


H 


 


 


Activated Partial


Thromboplast Time 49 SEC (23-33)


H 


 


 


Sodium Level


  142 MMOL/L


(136-145) 


 


 


Potassium Level


  3.9 MMOL/L


(3.5-5.1) 


 


 


Chloride Level


  107 MMOL/L


() 


 


 


Carbon Dioxide Level


  24 MMOL/L


(21-32) 


 


 


Anion Gap


  11 mmol/L


(5-15) 


 


 


Blood Urea Nitrogen


  20 mg/dL


(7-18)  H 


 


 


Creatinine


  1.2 MG/DL


(0.55-1.30) 


 


 


Estimated Glomerular


Filtration Rate > 60 mL/min


(>60) 


 


 


Glucose Level


  111 MG/DL


()  H 


 


 


Calcium Level


  8.3 MG/DL


(8.5-10.1)  L 


 


 


Total Bilirubin


  0.3 MG/DL


(0.2-1.0) 


 


 


Aspartate Amino Transferase


(AST) 16 U/L (15-37)


  


 


 


Alanine Aminotransferase (ALT)


  20 U/L (12-78)


  


 


 


Alkaline Phosphatase


  79 U/L


() 


 


 


Total Protein


  7.5 G/DL


(6.4-8.2) 


 


 


Albumin


  3.6 G/DL


(3.4-5.0) 


 


 


Globulin 3.9 g/dL   


 


Albumin/Globulin Ratio


  0.9 (1.0-2.7)


L 


 


 


Urine Color  Red  


 


Urine Appearance  Cloudy  


 


Urine pH  5 (4.5-8.0)  


 


Urine Specific Gravity


  


  1.025


(1.005-1.035)


 


Urine Protein


  


  3+ (NEGATIVE)


H


 


Urine Glucose (UA)


  


  Negative


(NEGATIVE)


 


Urine Ketones


  


  1+ (NEGATIVE)


H


 


Urine Blood


  


  5+ (NEGATIVE)


H


 


Urine Nitrite


  


  Negative


(NEGATIVE)


 


Urine Bilirubin


  


  Negative


(NEGATIVE)


 


Urine Urobilinogen


  


  Normal MG/DL


(0.0-1.0)


 


Urine Leukocyte Esterase


  


  2+ (NEGATIVE)


H


 


Urine RBC


  


  Tntc /HPF (0 -


0)  H


 


Urine WBC


  


  10-15 /HPF (0


- 0)  H


 


Urine Squamous Epithelial


Cells 


  None /LPF


(NONE/OCC)


 


Urine Bacteria


  


  Few /HPF


(NONE)








EKG Diagnostic Results


EKG Time:  00:18


Rate:  tachycardiac


Rhythm:  other - Atrial fibrillation


Other Impression


Rapid atrial fibrillation with normal axis.





Rhythm Strip Diag. Results


Rhythm Strip Time:  00:18


EP Interpretation:  yes


Rate:  135


Rhythm:  other - Atrial fibrillation





Last Vital Signs








  Date Time  Temp Pulse Resp B/P (MAP) Pulse Ox O2 Delivery O2 Flow Rate FiO2


 


7/12/20 23:45 98.2 86 18 124/70 98 Room Air  








Disposition:  SHORT-TERM HOSP


Condition:  Stable


Referrals:  


Gabriel Hernandez MD (PCP)











Mik Phillip MD Jul 13, 2020 00:40

## 2023-07-28 NOTE — NUR
****DISCHARGE PLANNING

CLINICALS FAXED TO 

[facility]

**AWAIT BED ASSIGNMENT

-------------------------------------------------------------------------------

Addendum: 06/03/19 at 1120 by SELENA BALTAZAR LVN LVN

-------------------------------------------------------------------------------

ALSO FAXED TO [facility]

**AWAIT BED ASSIGNMENT Syncope